# Patient Record
Sex: FEMALE | Race: WHITE | Employment: FULL TIME | URBAN - METROPOLITAN AREA
[De-identification: names, ages, dates, MRNs, and addresses within clinical notes are randomized per-mention and may not be internally consistent; named-entity substitution may affect disease eponyms.]

---

## 2019-06-14 ENCOUNTER — OFFICE VISIT (OUTPATIENT)
Dept: PODIATRY | Age: 34
End: 2019-06-14
Payer: COMMERCIAL

## 2019-06-14 VITALS — WEIGHT: 204 LBS | BODY MASS INDEX: 32.02 KG/M2 | HEIGHT: 67 IN

## 2019-06-14 DIAGNOSIS — M79.674 PAIN OF TOE OF RIGHT FOOT: ICD-10-CM

## 2019-06-14 DIAGNOSIS — L60.0 ONYCHOCRYPTOSIS: Primary | ICD-10-CM

## 2019-06-14 PROCEDURE — 99213 OFFICE O/P EST LOW 20 MIN: CPT | Performed by: PODIATRIST

## 2019-06-14 PROCEDURE — 11750 EXCISION NAIL&NAIL MATRIX: CPT | Performed by: PODIATRIST

## 2019-06-14 RX ORDER — ESTRADIOL 0.1 MG/D
FILM, EXTENDED RELEASE TRANSDERMAL
COMMUNITY
Start: 2019-06-09

## 2019-06-14 NOTE — PROGRESS NOTES
19     Trace Vásquez    : 1985   Sex: female    Age: 37 y.o. Patient's PCP/Provider is:  Cora Wayne MD    Subjective:  Patient is seen today with a chief complaint of painful ingrown nail to the right great toe medial border. Patient stated this issues been going on for the last several weeks. Patient presented today to discuss treatment options available. Patient denies any nausea, vomiting, fever, chills. No other issues noted at this time. Chief Complaint   Patient presents with    Toe Pain     great right toe       ROS:  Const: Positives and pertinent negatives as per HPI. Musculo: Denies symptoms other than stated above. Neuro: Denies symptoms other than stated above. Skin: Denies symptoms other than stated above. Current Medications:    Current Outpatient Medications:     Phentermine-Topiramate (QSYMIA) 3.75-23 MG CP24, Take 1 capsule by mouth., Disp: , Rfl:     metFORMIN (GLUCOPHAGE) 1000 MG tablet, Take 1,000 mg by mouth 2 times daily (with meals), Disp: , Rfl:     LEVOTHYROXINE SODIUM PO, Take by mouth, Disp: , Rfl:     OXcarbazepine (TRILEPTAL PO), Take by mouth, Disp: , Rfl:     estradiol (VIVELLE) 0.1 MG/24HR, , Disp: , Rfl:     naproxen (NAPROSYN) 500 MG tablet, Take 1 tablet by mouth 2 times daily, Disp: 60 tablet, Rfl: 0    Allergies: Allergies   Allergen Reactions    Zonisamide Itching and Rash    Lamictal [Lamotrigine]        Vitals:    19 1104   Weight: 204 lb (92.5 kg)   Height: 5' 7\" (1.702 m)       Exam:  VASCULAR: Pedal pulses palpable right foot. Capillary fill time brisk digits 1 through 5 right foot. NEUROLOGICAL: Epicritic sensations intact digital regions right foot. DERMATOLOGICAL: Medial border right great toe is incurvated at this time with tenderness noted to palpation. No purulence, odor, erythema or any signs of infection noted right great toe.   MUSCULOSKELETAL: Noncontributory    Diagnostic Studies:     No results was performed right great toe as described above. Patient tolerated the procedure without issues. Patient was given home-going instructions to be performed as instructed. It was discussed in detail with the patient proper hygiene for the diabetic foot. They are to get in the habit of looking at their feet or have someone look at them. If they are unable to do daily, they are to look for any signs of redness, blistering, cracking, swelling, drainage, open lesions, etc.  They are to dry in between the toes after each bath or shower gently. The water should be tested with the elbow to prevent burns. The patient is to refrain from soaking their feet unless instructed by myself to do otherwise. They are to refrain from going barefoot. Shoe gear should be inspected for any foreign objects. Shoes should have a deep wide toe box. With any type of shoe, the feet should be inspected for any signs of pressure, i.e. redness, blistering, or open sores. Further instructional guidelines were dispensed to the patient. Patient will be followed up in 1 week's time or sooner if needed for reevaluation. She was advised to call the office with any questions or concerns in the interim. Seen By:    Mark Tellez DPM    Electronically signed by Mark Tellez DPM on 6/14/2019 at 3:29 PM    This note was created using voice recognition software. The note was reviewed however may contain grammatical errors.

## 2019-06-14 NOTE — PROGRESS NOTES
Patient in today for evaluation of possible ingrown nail right great toe. Patient states she had a pocket of pus that developed and popped and has been bleeding on and off. Patient has been using neosporin at the site but does not seem to be helping. Patient was in [facility] on a trip when the problem developed after doing a lot of walking.  pcp is Gurmeet Earl MD last ov 3-49-67

## 2019-06-24 ENCOUNTER — OFFICE VISIT (OUTPATIENT)
Dept: PODIATRY | Age: 34
End: 2019-06-24

## 2019-06-24 DIAGNOSIS — L60.0 ONYCHOCRYPTOSIS: Primary | ICD-10-CM

## 2019-06-24 PROCEDURE — 99024 POSTOP FOLLOW-UP VISIT: CPT | Performed by: PODIATRIST

## 2019-06-24 NOTE — PROGRESS NOTES
Patient in today for 1 week matrix f/u great right toe. Patient states she has had blood drainage and the site is still very tender.  pcp is Allyson Tripp MD last ov 7-80-91

## 2019-06-24 NOTE — PROGRESS NOTES
19     Savage Wilder    : 1985   Sex: female    Age: 37 y.o. Patient's PCP/Provider is:  Ella Valle MD    Subjective:  Patient is seen today follow-up regarding continued treatment post matrix procedure right great toe. Overall patient is doing well at this time without any issues noted. Patient denies any nausea, vomiting, fever, chills. Chief Complaint   Patient presents with    Follow-up     great right toe matrix       ROS:  Const: Positives and pertinent negatives as per HPI. Musculo: Denies symptoms other than stated above. Neuro: Denies symptoms other than stated above. Skin: Denies symptoms other than stated above. Current Medications:    Current Outpatient Medications:     estradiol (VIVELLE) 0.1 MG/24HR, , Disp: , Rfl:     Phentermine-Topiramate (QSYMIA) 3.75-23 MG CP24, Take 1 capsule by mouth., Disp: , Rfl:     metFORMIN (GLUCOPHAGE) 1000 MG tablet, Take 1,000 mg by mouth 2 times daily (with meals), Disp: , Rfl:     LEVOTHYROXINE SODIUM PO, Take by mouth, Disp: , Rfl:     OXcarbazepine (TRILEPTAL PO), Take by mouth, Disp: , Rfl:     naproxen (NAPROSYN) 500 MG tablet, Take 1 tablet by mouth 2 times daily, Disp: 60 tablet, Rfl: 0    Allergies: Allergies   Allergen Reactions    Zonisamide Itching and Rash    Lamictal [Lamotrigine]        There were no vitals filed for this visit. Exam:  Neurovascular status grossly intact right foot. Medial border right great toe is well-healed. No tenderness noted to palpation matrix site right great toe. No signs of infection noted right great toe. Diagnostic Studies:     No results found. Procedures:    None    Plan Per Assessment  [facility] was seen today for follow-up. Diagnoses and all orders for this visit:    Onychocryptosis      1. Post procedure evaluation and management  2. We did discuss appropriate nail trimming techniques to prevent reoccurrence.   Shoe recommendations were discussed with adequate room in the toe box regions to prevent reoccurrence. 3. Patient will be followed up at a later date if any further Podiatric issues arise. Seen By:    Maged Szymanski DPM    Electronically signed by Maged Szymanski DPM on 6/24/2019 at 10:01 AM    This note was created using voice recognition software. The note was reviewed however may contain grammatical errors.

## 2020-03-30 ENCOUNTER — OFFICE VISIT (OUTPATIENT)
Dept: PODIATRY | Age: 35
End: 2020-03-30
Payer: COMMERCIAL

## 2020-03-30 VITALS
WEIGHT: 193 LBS | TEMPERATURE: 97.3 F | HEIGHT: 68 IN | HEART RATE: 108 BPM | OXYGEN SATURATION: 96 % | BODY MASS INDEX: 29.25 KG/M2

## 2020-03-30 PROBLEM — E11.9 TYPE 2 DIABETES MELLITUS WITHOUT COMPLICATION, WITHOUT LONG-TERM CURRENT USE OF INSULIN (HCC): Status: ACTIVE | Noted: 2020-03-30

## 2020-03-30 PROBLEM — L60.0 OC (ONYCHOCRYPTOSIS): Status: ACTIVE | Noted: 2020-03-30

## 2020-03-30 PROCEDURE — G8417 CALC BMI ABV UP PARAM F/U: HCPCS | Performed by: PODIATRIST

## 2020-03-30 PROCEDURE — 1036F TOBACCO NON-USER: CPT | Performed by: PODIATRIST

## 2020-03-30 PROCEDURE — 99213 OFFICE O/P EST LOW 20 MIN: CPT | Performed by: PODIATRIST

## 2020-03-30 PROCEDURE — G8484 FLU IMMUNIZE NO ADMIN: HCPCS | Performed by: PODIATRIST

## 2020-03-30 PROCEDURE — 2022F DILAT RTA XM EVC RTNOPTHY: CPT | Performed by: PODIATRIST

## 2020-03-30 PROCEDURE — 3046F HEMOGLOBIN A1C LEVEL >9.0%: CPT | Performed by: PODIATRIST

## 2020-03-30 PROCEDURE — G8427 DOCREV CUR MEDS BY ELIG CLIN: HCPCS | Performed by: PODIATRIST

## 2020-03-30 NOTE — PROGRESS NOTES
3/30/20     Zack Summers    : 1985   Sex: female    Age: 37 y.o. Patient's PCP/Provider is:  Paulino Cerda MD    Subjective:  Patient is seen today for evaluation regarding treatment regarding ingrown nail left great toe. Patient stated the issues been going on over the last 7 days or so and progressively getting worse. She was concerned due to possible infection which she has had in the past.  She denies any additional issues at this time. No other abnormalities noted. Chief Complaint   Patient presents with    Ingrown Toenail     great left toe       ROS:  Const: Positives and pertinent negatives as per HPI. Musculo: Denies symptoms other than stated above. Neuro: Denies symptoms other than stated above. Skin: Denies symptoms other than stated above. Current Medications:    Current Outpatient Medications:     estradiol (VIVELLE) 0.1 MG/24HR, , Disp: , Rfl:     Phentermine-Topiramate (QSYMIA) 3.75-23 MG CP24, Take 1 capsule by mouth., Disp: , Rfl:     metFORMIN (GLUCOPHAGE) 1000 MG tablet, Take 1,000 mg by mouth 2 times daily (with meals), Disp: , Rfl:     LEVOTHYROXINE SODIUM PO, Take by mouth, Disp: , Rfl:     OXcarbazepine (TRILEPTAL PO), Take by mouth, Disp: , Rfl:     naproxen (NAPROSYN) 500 MG tablet, Take 1 tablet by mouth 2 times daily, Disp: 60 tablet, Rfl: 0    Allergies: Allergies   Allergen Reactions    Zonisamide Itching and Rash    Lamictal [Lamotrigine]        Vitals:    20 1301   Pulse: 108   Temp: 97.3 °F (36.3 °C)   SpO2: 96%   Weight: 193 lb (87.5 kg)   Height: 5' 8\" (1.727 m)       Exam:  VASCULAR: Pedal pulses palpable left foot. Capillary fill time less than 3 seconds digits 1 through 5 left foot. NEUROLOGICAL: Epicritic sensations intact digital regions left foot. DERMATOLOGICAL: Medial border left great toe is incurvated with tenderness noted to palpation. No purulence, odor, erythema or any signs of infection noted left great toe.   No maceration of the webspaces noted left foot. MUSCULOSKELETAL: Adequate range of motion left great toe IPJ and MTPJ region. Diagnostic Studies:     No results found. Procedures:    None    Plan Per Assessment  Madalyn was seen today for ingrown toenail. Diagnoses and all orders for this visit:    OC (onychocryptosis)    Pain of toe of left foot    Type 2 diabetes mellitus without complication, without long-term current use of insulin ([address]      1. Evaluation and management  2. Debridement ingrown nail was performed to patient tolerance. We did advise patient on appropriate nail trimming techniques to prevent reoccurrence. No nail matrix procedure was performed on today's visit. 3. It was discussed in detail with the patient proper hygiene for the diabetic foot. They are to get in the habit of looking at their feet or have someone look at them. If they are unable to do daily, they are to look for any signs of redness, blistering, cracking, swelling, drainage, open lesions, etc.  They are to dry in between the toes after each bath or shower gently. The water should be tested with the elbow to prevent burns. The patient is to refrain from soaking their feet unless instructed by myself to do otherwise. They are to refrain from going barefoot. Shoe gear should be inspected for any foreign objects. Shoes should have a deep wide toe box. With any type of shoe, the feet should be inspected for any signs of pressure, i.e. redness, blistering, or open sores. Further instructional guidelines were dispensed to the patient. 4. Patient will be followed up at a later date if any further diabetic or Podiatric issues arise. Seen By:    Rodney Wade DPM    Electronically signed by Rodney Wade DPM on 3/30/2020 at 1:29 PM    This note was created using voice recognition software. The note was reviewed however may contain grammatical errors.

## 2020-08-19 ENCOUNTER — OFFICE VISIT (OUTPATIENT)
Dept: PODIATRY | Age: 35
End: 2020-08-19
Payer: COMMERCIAL

## 2020-08-19 VITALS — BODY MASS INDEX: 29.25 KG/M2 | WEIGHT: 193 LBS | HEIGHT: 68 IN | TEMPERATURE: 97.8 F

## 2020-08-19 PROBLEM — M79.675 PAIN OF TOE OF LEFT FOOT: Status: ACTIVE | Noted: 2020-08-19

## 2020-08-19 PROCEDURE — 99213 OFFICE O/P EST LOW 20 MIN: CPT | Performed by: PODIATRIST

## 2020-08-19 PROCEDURE — G8427 DOCREV CUR MEDS BY ELIG CLIN: HCPCS | Performed by: PODIATRIST

## 2020-08-19 PROCEDURE — 1036F TOBACCO NON-USER: CPT | Performed by: PODIATRIST

## 2020-08-19 PROCEDURE — G8417 CALC BMI ABV UP PARAM F/U: HCPCS | Performed by: PODIATRIST

## 2020-08-19 NOTE — PROGRESS NOTES
Patient is here today for evaluation of painful great left toe nail. She states it has recently been bleeding.

## 2020-08-19 NOTE — PROGRESS NOTES
20     Darrell Payne    : 1985   Sex: female    Age: 38 y.o. Patient's PCP/Provider is:  Vianey Porter MD    Subjective:  Patient is seen today for evaluation regarding ingrown nail issue left great toe. Patient stated the issues been going on over the last 5 to 7 days and started bleeding over the weekend. She presented today for evaluation and care. She denies any nausea, vomiting, fever, chills. Chief Complaint   Patient presents with    Nail Problem       ROS:  Const: Positives and pertinent negatives as per HPI. Musculo: Denies symptoms other than stated above. Neuro: Denies symptoms other than stated above. Skin: Denies symptoms other than stated above. Current Medications:    Current Outpatient Medications:     estradiol (VIVELLE) 0.1 MG/24HR, , Disp: , Rfl:     Phentermine-Topiramate (QSYMIA) 3.75-23 MG CP24, Take 1 capsule by mouth., Disp: , Rfl:     metFORMIN (GLUCOPHAGE) 1000 MG tablet, Take 1,000 mg by mouth 2 times daily (with meals), Disp: , Rfl:     LEVOTHYROXINE SODIUM PO, Take by mouth, Disp: , Rfl:     OXcarbazepine (TRILEPTAL PO), Take by mouth, Disp: , Rfl:     naproxen (NAPROSYN) 500 MG tablet, Take 1 tablet by mouth 2 times daily, Disp: 60 tablet, Rfl: 0    Allergies: Allergies   Allergen Reactions    Zonisamide Itching and Rash    Lamictal [Lamotrigine]        Vitals:    20 0707   Temp: 97.8 °F (36.6 °C)   Weight: 193 lb (87.5 kg)   Height: 5' 8\" (1.727 m)       Exam:  Neurovascular status unchanged. Tenderness noted medial nail border left great toe. Incurvated nail border noted without signs of infection noted. No hyper granular tissue or any bleeding noted left great toe nail border regions. No maceration webspaces noted left foot. Diagnostic Studies:     No results found. Procedures:    None    Plan Per Assessment  Yuan Howell was seen today for nail problem.     Diagnoses and all orders for this visit:    Onychocryptosis    Pain of toe of left foot      1. Evaluation and management  2. Nail debridement performed ingrown nail border left great toe to patient tolerance. No nail matrix procedure performed on today's visit. 3. We did discuss appropriate nail trimming techniques to prevent reoccurrence. We did discuss appropriate diabetic foot care techniques as well today. 4. Patient will be followed up at a later date for continued evaluation and diabetic foot care. Seen By:    Priscila Rosas DPM    Electronically signed by Priscila Rosas DPM on 8/19/2020 at 7:28 AM    This note was created using voice recognition software. The note was reviewed however may contain grammatical errors.

## 2020-10-07 ENCOUNTER — OFFICE VISIT (OUTPATIENT)
Dept: PODIATRY | Age: 35
End: 2020-10-07
Payer: COMMERCIAL

## 2020-10-07 VITALS — BODY MASS INDEX: 28.64 KG/M2 | HEIGHT: 68 IN | WEIGHT: 189 LBS | TEMPERATURE: 97.1 F

## 2020-10-07 PROBLEM — M72.2 PLANTAR FASCIITIS: Status: ACTIVE | Noted: 2020-10-07

## 2020-10-07 PROCEDURE — 1036F TOBACCO NON-USER: CPT | Performed by: PODIATRIST

## 2020-10-07 PROCEDURE — G8484 FLU IMMUNIZE NO ADMIN: HCPCS | Performed by: PODIATRIST

## 2020-10-07 PROCEDURE — 99213 OFFICE O/P EST LOW 20 MIN: CPT | Performed by: PODIATRIST

## 2020-10-07 PROCEDURE — G8428 CUR MEDS NOT DOCUMENT: HCPCS | Performed by: PODIATRIST

## 2020-10-07 PROCEDURE — G8417 CALC BMI ABV UP PARAM F/U: HCPCS | Performed by: PODIATRIST

## 2020-10-07 NOTE — PROGRESS NOTES
10/7/20     Tonya China    : 1985   Sex: female    Age: 38 y.o. Patient's PCP/Provider is:  Sofi Aj MD    Subjective:  Patient is seen today for evaluation regarding plantar fascial issues left foot. Patient stated that she started a walking regimen to lose some weight several weeks ago and started noticing some paresthesias into the plantar left arch and great toe region. She denies any recent injury. She stated that she was getting some discomfort into the left great toe region as well. No other additional abnormalities noted at this time. Chief Complaint   Patient presents with    Ingrown Toenail    Toe Pain       ROS:  Const: Positives and pertinent negatives as per HPI. Musculo: Denies symptoms other than stated above. Neuro: Denies symptoms other than stated above. Skin: Denies symptoms other than stated above. Current Medications:    Current Outpatient Medications:     estradiol (VIVELLE) 0.1 MG/24HR, , Disp: , Rfl:     Phentermine-Topiramate (QSYMIA) 3.75-23 MG CP24, Take 1 capsule by mouth., Disp: , Rfl:     metFORMIN (GLUCOPHAGE) 1000 MG tablet, Take 1,000 mg by mouth 2 times daily (with meals), Disp: , Rfl:     LEVOTHYROXINE SODIUM PO, Take by mouth, Disp: , Rfl:     OXcarbazepine (TRILEPTAL PO), Take by mouth, Disp: , Rfl:     naproxen (NAPROSYN) 500 MG tablet, Take 1 tablet by mouth 2 times daily, Disp: 60 tablet, Rfl: 0    Allergies: Allergies   Allergen Reactions    Zonisamide Itching and Rash    Lamictal [Lamotrigine]        Vitals:    10/07/20 0702   Temp: 97.1 °F (36.2 °C)   Weight: 189 lb (85.7 kg)   Height: 5' 8\" (1.727 m)       Exam:  Neurovascular status unchanged. Mild tenderness into the plantar fascial region left foot with range of motion and muscle testing performed. Mild paresthesias noted along the course of the tibial nerve left foot. No ingrown nail issues noted left great toe.       Diagnostic Studies:     No results found.      Procedures:    None    Plan Per Assessment  [facility] was seen today for ingrown toenail and toe pain. Diagnoses and all orders for this visit:    Plantar fasciitis    Pain in left foot    Difficulty walking      1. Evaluation and management  2. We did discuss continued stretching and strengthening exercises on a daily basis to relieve her plantar fascial issues. 3. Most importantly, we did discuss purchasing athletic shoe gear to give her added support with her walking activities to prevent the paresthesia issues and potential for ingrown nail issues bilateral foot. 4. Patient will be followed up for her regularly scheduled foot care appointment or sooner if needed. Seen By:    Violetta Barnes DPM    Electronically signed by Violetta Barnes DPM on 10/7/2020 at 7:24 AM    This note was created using voice recognition software. The note was reviewed however may contain grammatical errors.

## 2020-11-18 ENCOUNTER — OFFICE VISIT (OUTPATIENT)
Dept: PODIATRY | Age: 35
End: 2020-11-18
Payer: COMMERCIAL

## 2020-11-18 VITALS — TEMPERATURE: 97.3 F | WEIGHT: 185 LBS | HEIGHT: 68 IN | BODY MASS INDEX: 28.04 KG/M2

## 2020-11-18 PROCEDURE — G8484 FLU IMMUNIZE NO ADMIN: HCPCS | Performed by: PODIATRIST

## 2020-11-18 PROCEDURE — 1036F TOBACCO NON-USER: CPT | Performed by: PODIATRIST

## 2020-11-18 PROCEDURE — G8417 CALC BMI ABV UP PARAM F/U: HCPCS | Performed by: PODIATRIST

## 2020-11-18 PROCEDURE — 2022F DILAT RTA XM EVC RTNOPTHY: CPT | Performed by: PODIATRIST

## 2020-11-18 PROCEDURE — G8427 DOCREV CUR MEDS BY ELIG CLIN: HCPCS | Performed by: PODIATRIST

## 2020-11-18 PROCEDURE — 3046F HEMOGLOBIN A1C LEVEL >9.0%: CPT | Performed by: PODIATRIST

## 2020-11-18 PROCEDURE — 99213 OFFICE O/P EST LOW 20 MIN: CPT | Performed by: PODIATRIST

## 2020-11-18 NOTE — PROGRESS NOTES
Patient in today for nail care. Patient c/o pain to the left heel. She did purchase castro tennis shoes after her last visit with us and has been wearing them as much as she can for approximately 1 moth.  Patient's PCP is Trevon Boswell MD date of last ov   10-      Jarad Connelly LPN

## 2020-11-18 NOTE — PROGRESS NOTES
20     Doyal Sera    : 1985   Sex: female    Age: 38 y.o. Patient's PCP/Provider is:  Trevon Boswell MD    Subjective:  Patient is seen today for follow-up regarding continued care ingrown nail issues left great toe. Overall patient is doing better at this time without any additional issues. She denies any nausea, vomiting, fever, chills. No other additional abnormalities noted. Chief Complaint   Patient presents with    Nail Problem    Foot Pain       ROS:  Const: Positives and pertinent negatives as per HPI. Musculo: Denies symptoms other than stated above. Neuro: Denies symptoms other than stated above. Skin: Denies symptoms other than stated above. Current Medications:    Current Outpatient Medications:     Semaglutide (OZEMPIC, 0.25 OR 0.5 MG/DOSE, SC), Inject into the skin, Disp: , Rfl:     estradiol (VIVELLE) 0.1 MG/24HR, , Disp: , Rfl:     Phentermine-Topiramate (QSYMIA) 3.75-23 MG CP24, Take 1 capsule by mouth., Disp: , Rfl:     metFORMIN (GLUCOPHAGE) 1000 MG tablet, Take 1,000 mg by mouth 2 times daily (with meals), Disp: , Rfl:     LEVOTHYROXINE SODIUM PO, Take by mouth, Disp: , Rfl:     OXcarbazepine (TRILEPTAL PO), Take by mouth, Disp: , Rfl:     naproxen (NAPROSYN) 500 MG tablet, Take 1 tablet by mouth 2 times daily, Disp: 60 tablet, Rfl: 0    Allergies: Allergies   Allergen Reactions    Zonisamide Itching and Rash    Lamictal [Lamotrigine]        Vitals:    20 0656   Temp: 97.3 °F (36.3 °C)   Weight: 185 lb (83.9 kg)   Height: 5' 8\" (1.727 m)       Exam:  Neurovascular status unchanged. Left great toe does have incurvated nail borders noted. Minimal tenderness noted to palpation. No signs of infection noted left great toe. No maceration of webspaces noted bilateral foot. No plantar calluses, ulcerations, heel fissuring noted bilateral foot. Diagnostic Studies:     No results found.       Procedures:    None    Plan Per Rory Reeves was seen today for nail problem and foot pain. Diagnoses and all orders for this visit:    OC (onychocryptosis)    Pain of toe of left foot    Type 2 diabetes mellitus without complication, without long-term current use of insulin ([address]      1. Evaluation and management  2. Debridement ingrown nail borders was performed to patient tolerance. No nail matrix procedure performed on today's visit. 3. Patient was discussed appropriate diabetic foot care techniques and nail care techniques to prevent reoccurrence. 4. Patient will be followed up at a later date for continued evaluation and management. Seen By:    Arpita Lugo DPM    Electronically signed by Arpita Lugo DPM on 11/18/2020 at 7:37 AM    This note was created using voice recognition software. The note was reviewed however may contain grammatical errors.

## 2021-01-20 ENCOUNTER — OFFICE VISIT (OUTPATIENT)
Dept: PODIATRY | Age: 36
End: 2021-01-20
Payer: COMMERCIAL

## 2021-01-20 VITALS — WEIGHT: 185 LBS | BODY MASS INDEX: 28.04 KG/M2 | HEIGHT: 68 IN

## 2021-01-20 DIAGNOSIS — M25.572 PAIN IN LEFT ANKLE AND JOINTS OF LEFT FOOT: ICD-10-CM

## 2021-01-20 DIAGNOSIS — R60.0 LOCALIZED EDEMA: ICD-10-CM

## 2021-01-20 DIAGNOSIS — M77.52 TENDONITIS OF ANKLE, LEFT: ICD-10-CM

## 2021-01-20 DIAGNOSIS — M79.675 PAIN OF TOE OF LEFT FOOT: ICD-10-CM

## 2021-01-20 DIAGNOSIS — L60.0 OC (ONYCHOCRYPTOSIS): Primary | ICD-10-CM

## 2021-01-20 DIAGNOSIS — R26.2 DIFFICULTY WALKING: ICD-10-CM

## 2021-01-20 DIAGNOSIS — E11.9 TYPE 2 DIABETES MELLITUS WITHOUT COMPLICATION, WITHOUT LONG-TERM CURRENT USE OF INSULIN (HCC): ICD-10-CM

## 2021-01-20 PROCEDURE — 29580 STRAPPING UNNA BOOT: CPT | Performed by: PODIATRIST

## 2021-01-20 PROCEDURE — 2022F DILAT RTA XM EVC RTNOPTHY: CPT | Performed by: PODIATRIST

## 2021-01-20 PROCEDURE — 1036F TOBACCO NON-USER: CPT | Performed by: PODIATRIST

## 2021-01-20 PROCEDURE — 3046F HEMOGLOBIN A1C LEVEL >9.0%: CPT | Performed by: PODIATRIST

## 2021-01-20 PROCEDURE — G8417 CALC BMI ABV UP PARAM F/U: HCPCS | Performed by: PODIATRIST

## 2021-01-20 PROCEDURE — G8427 DOCREV CUR MEDS BY ELIG CLIN: HCPCS | Performed by: PODIATRIST

## 2021-01-20 PROCEDURE — G8484 FLU IMMUNIZE NO ADMIN: HCPCS | Performed by: PODIATRIST

## 2021-01-20 PROCEDURE — 99213 OFFICE O/P EST LOW 20 MIN: CPT | Performed by: PODIATRIST

## 2021-01-20 NOTE — PROGRESS NOTES
21     Beth Kevin    : 1985   Sex: female    Age: 38 y.o. Patient's PCP/Provider is:  Milton Yeboah MD    Subjective:  Patient is seen today for evaluation regarding painful ingrown nail left great toe. She is also complaining of new onset anterior left ankle pain and swelling. She did start a new exercise regimen over the last several weeks which has contributed to her current symptoms left ankle. She denies any recent injury to the left lower extremity. Patient denies any nausea, vomiting, fever, chills regarding the ingrown nail issue left great toe. No other additional abnormalities noted at this time. Chief Complaint   Patient presents with    Nail Problem     nail care    Diabetes    Foot Pain     left/plantar fasciitis       ROS:  Const: Positives and pertinent negatives as per HPI. Musculo: Denies symptoms other than stated above. Neuro: Denies symptoms other than stated above. Skin: Denies symptoms other than stated above. Current Medications:    Current Outpatient Medications:     Semaglutide (OZEMPIC, 0.25 OR 0.5 MG/DOSE, SC), Inject into the skin, Disp: , Rfl:     estradiol (VIVELLE) 0.1 MG/24HR, , Disp: , Rfl:     Phentermine-Topiramate (QSYMIA) 3.75-23 MG CP24, Take 1 capsule by mouth., Disp: , Rfl:     metFORMIN (GLUCOPHAGE) 1000 MG tablet, Take 1,000 mg by mouth 2 times daily (with meals), Disp: , Rfl:     LEVOTHYROXINE SODIUM PO, Take by mouth, Disp: , Rfl:     OXcarbazepine (TRILEPTAL PO), Take by mouth, Disp: , Rfl:     naproxen (NAPROSYN) 500 MG tablet, Take 1 tablet by mouth 2 times daily, Disp: 60 tablet, Rfl: 0    Allergies:   Allergies   Allergen Reactions    Zonisamide Itching and Rash    Lamictal [Lamotrigine]        Vitals:    21 0658   Weight: 185 lb (83.9 kg)   Height: 5' 8\" (1.727 m)       Exam: Neurovascular status unchanged. Tenderness noted to the left great toe and lateral nail border to palpation. No purulence, odor, erythema or any signs of infection noted left great toe. No maceration webspaces noted left foot. Edema noted anterior aspect left ankle with tenderness along the course of the extensor tendon complex with range of motion and muscle testing performed. No ecchymotic skin changes present left lower extremity. Diagnostic Studies:     No results found. Procedures: An unna boot  compressive wrap was applied to the left lower extremity. It's purpose is to  decrease  the amount of edema present, and to allow proper healing of the soft tissues. The patient was instructed to keep the unna boot clean, dry and intact until the next follow up visit. The patient was instructed to look for signs of redness, irritation, blistering and pain. If these or any other symptoms were to develop, they were advised to contact the office immediately for reevaluation. Plan Per Assessment  [employer] was seen today for nail problem, diabetes and foot pain. Diagnoses and all orders for this visit:    OC (onychocryptosis)    Pain of toe of left foot    Tendonitis of ankle, left    Localized edema    Pain in left ankle and joints of left foot    Difficulty walking    Type 2 diabetes mellitus without complication, without long-term current use of insulin ([address]      1. Evaluation and management  2. Debridement ingrown nail was performed to patient tolerance. No nail matrix procedure performed on today's visit. 3. Compression dressing was applied to the symptomatic left lower extremity as described above. Patient was advised on limiting her daily activities and elevating the left lower extremity throughout the day for continued symptom improvement. 4. We did discuss additional diabetic foot care techniques with patient in detail today. 5. Patient will be followed up in 1 week's time or sooner if needed for reevaluation. She was advised to call the office with any questions or concerns in the interim. Seen By:    Mishel Rosales DPM    Electronically signed by Mishel Rosales DPM on 1/20/2021 at 11:04 AM    This note was created using voice recognition software. The note was reviewed however may contain grammatical errors.

## 2021-01-20 NOTE — PROGRESS NOTES
Patient here for routine nail care, but has complaints of pain in the left foot with the plantar fasciitis. Patient states that she has been walking more to loose weight and the pain in the foot increased.  Last ov with pcp was 1/19/2021 with [place] MD             Electronically signed by Dulce Mcintosh MA on 1/20/2021 at 6:57 AM

## 2021-01-27 ENCOUNTER — OFFICE VISIT (OUTPATIENT)
Dept: PODIATRY | Age: 36
End: 2021-01-27
Payer: COMMERCIAL

## 2021-01-27 VITALS — HEIGHT: 68 IN | BODY MASS INDEX: 28.04 KG/M2 | WEIGHT: 185 LBS

## 2021-01-27 DIAGNOSIS — M77.52 TENDONITIS OF ANKLE, LEFT: Primary | ICD-10-CM

## 2021-01-27 DIAGNOSIS — E11.9 TYPE 2 DIABETES MELLITUS WITHOUT COMPLICATION, WITHOUT LONG-TERM CURRENT USE OF INSULIN (HCC): ICD-10-CM

## 2021-01-27 DIAGNOSIS — M72.2 PLANTAR FASCIITIS: ICD-10-CM

## 2021-01-27 PROBLEM — M79.675 PAIN OF TOE OF LEFT FOOT: Status: RESOLVED | Noted: 2020-08-19 | Resolved: 2021-01-27

## 2021-01-27 PROCEDURE — 3046F HEMOGLOBIN A1C LEVEL >9.0%: CPT | Performed by: PODIATRIST

## 2021-01-27 PROCEDURE — 99213 OFFICE O/P EST LOW 20 MIN: CPT | Performed by: PODIATRIST

## 2021-01-27 PROCEDURE — G8427 DOCREV CUR MEDS BY ELIG CLIN: HCPCS | Performed by: PODIATRIST

## 2021-01-27 PROCEDURE — 1036F TOBACCO NON-USER: CPT | Performed by: PODIATRIST

## 2021-01-27 PROCEDURE — G8417 CALC BMI ABV UP PARAM F/U: HCPCS | Performed by: PODIATRIST

## 2021-01-27 PROCEDURE — 2022F DILAT RTA XM EVC RTNOPTHY: CPT | Performed by: PODIATRIST

## 2021-01-27 PROCEDURE — G8484 FLU IMMUNIZE NO ADMIN: HCPCS | Performed by: PODIATRIST

## 2021-01-27 NOTE — PROGRESS NOTES
21     Lauren Lopez    : 1985   Sex: female    Age: 38 y.o. Patient's PCP/Provider is:  Diann Torres MD    Subjective:  Patient is seen today for follow-up regarding continued evaluation regarding tendinitis and plantar fascial issues left lower extremity. Patient stated she is about 75% improved from previous compression dressing applied. She denies any additional issues at this time. She did want to discuss other potential treatment options available to prevent reoccurrence. Chief Complaint   Patient presents with    Follow-up     unna wrap left       ROS:  Const: Positives and pertinent negatives as per HPI. Musculo: Denies symptoms other than stated above. Neuro: Denies symptoms other than stated above. Skin: Denies symptoms other than stated above. Current Medications:    Current Outpatient Medications:     Semaglutide (OZEMPIC, 0.25 OR 0.5 MG/DOSE, SC), Inject into the skin, Disp: , Rfl:     estradiol (VIVELLE) 0.1 MG/24HR, , Disp: , Rfl:     Phentermine-Topiramate (QSYMIA) 3.75-23 MG CP24, Take 1 capsule by mouth., Disp: , Rfl:     metFORMIN (GLUCOPHAGE) 1000 MG tablet, Take 1,000 mg by mouth 2 times daily (with meals), Disp: , Rfl:     LEVOTHYROXINE SODIUM PO, Take by mouth, Disp: , Rfl:     OXcarbazepine (TRILEPTAL PO), Take by mouth, Disp: , Rfl:     naproxen (NAPROSYN) 500 MG tablet, Take 1 tablet by mouth 2 times daily, Disp: 60 tablet, Rfl: 0    Allergies:   Allergies   Allergen Reactions    Zonisamide Itching and Rash    Lamictal [Lamotrigine]        Vitals:    21 0657   Weight: 185 lb (83.9 kg)   Height: 5' 8\" (1.727 m)       Exam: Neurovascular status unchanged. Minimal tenderness noted into the anterior left midfoot region and plantar fascial region with range of motion and muscle testing performed. Edematous issues are improved left lower extremity. No plantar calluses or heel fissuring noted left foot. No paresthesias noted upon percussion tarsal tunnel region left foot. Diagnostic Studies:     No results found. Procedures:    None    Plan Per Assessment  Marichuy Tierney was seen today for follow-up. Diagnoses and all orders for this visit:    Tendonitis of ankle, left    Plantar fasciitis    Type 2 diabetes mellitus without complication, without long-term current use of insulin ([address]      1. Evaluation and management  2. We did discuss additional treatment options with patient in detail today. We did give her information on purchasing OTC compression garments to be worn on the symptomatic lower extremities to prevent reoccurrence. 3. Patient was advised on daily stretching and strengthening exercises and use of her good supportive shoe gear and OTC insoles. Patient was advised on the use of OTC anti-inflammatory medications as needed for symptom issues. 4. Patient will be followed up at a later date for continued evaluation and care. Seen By:    Juni Lockwood DPM    Electronically signed by Juni Lockwood DPM on 1/27/2021 at 7:24 AM    This note was created using voice recognition software. The note was reviewed however may contain grammatical errors.

## 2021-01-27 NOTE — PROGRESS NOTES
Patient here for a week follow up unna wrap left foot. Patient kept the wrap on for four days and did notice some improvement. Patient states she still has some discomfort d/t the plantar fasciitis.           Electronically signed by More Cisneros MA on 1/27/2021 at 6:57 AM

## 2021-03-10 ENCOUNTER — OFFICE VISIT (OUTPATIENT)
Dept: PODIATRY | Age: 36
End: 2021-03-10
Payer: COMMERCIAL

## 2021-03-10 VITALS — BODY MASS INDEX: 28.04 KG/M2 | HEIGHT: 68 IN | WEIGHT: 185 LBS

## 2021-03-10 DIAGNOSIS — M79.672 PAIN IN LEFT FOOT: ICD-10-CM

## 2021-03-10 DIAGNOSIS — M72.2 PLANTAR FASCIITIS: Primary | ICD-10-CM

## 2021-03-10 DIAGNOSIS — R26.2 DIFFICULTY WALKING: ICD-10-CM

## 2021-03-10 PROCEDURE — G8484 FLU IMMUNIZE NO ADMIN: HCPCS | Performed by: PODIATRIST

## 2021-03-10 PROCEDURE — G8427 DOCREV CUR MEDS BY ELIG CLIN: HCPCS | Performed by: PODIATRIST

## 2021-03-10 PROCEDURE — 99213 OFFICE O/P EST LOW 20 MIN: CPT | Performed by: PODIATRIST

## 2021-03-10 PROCEDURE — 1036F TOBACCO NON-USER: CPT | Performed by: PODIATRIST

## 2021-03-10 PROCEDURE — G8417 CALC BMI ABV UP PARAM F/U: HCPCS | Performed by: PODIATRIST

## 2021-03-10 NOTE — PROGRESS NOTES
Patient is here follow up left foot. Patient states the sleeve for left foot has helped. Pain still in the left great toe.        Last ov with Chelsea Valentine MD  Was 03/2/21    Electronically signed by Julio Rojo LPN on 6/45/8671 at 1:98 AM

## 2021-03-10 NOTE — PROGRESS NOTES
3/10/21     Markos Holly    : 1985   Sex: female    Age: 38 y.o. Patient's PCP/Provider is:  Sheila Sebastian MD    Subjective:  Patient is seen today for follow-up regarding continued evaluation regarding plantar fascial issues left foot. Patient is wearing her good supportive shoe gear, OTC insoles, and compression sleeve as recommended with noted improvement in symptoms. She denies any additional issues at this time. Chief Complaint   Patient presents with    Follow-up     left       ROS:  Const: Positives and pertinent negatives as per HPI. Musculo: Denies symptoms other than stated above. Neuro: Denies symptoms other than stated above. Skin: Denies symptoms other than stated above. Current Medications:    Current Outpatient Medications:     Semaglutide (OZEMPIC, 0.25 OR 0.5 MG/DOSE, SC), Inject into the skin, Disp: , Rfl:     estradiol (VIVELLE) 0.1 MG/24HR, , Disp: , Rfl:     Phentermine-Topiramate (QSYMIA) 3.75-23 MG CP24, Take 1 capsule by mouth., Disp: , Rfl:     metFORMIN (GLUCOPHAGE) 1000 MG tablet, Take 1,000 mg by mouth 2 times daily (with meals), Disp: , Rfl:     LEVOTHYROXINE SODIUM PO, Take by mouth, Disp: , Rfl:     OXcarbazepine (TRILEPTAL PO), Take by mouth, Disp: , Rfl:     naproxen (NAPROSYN) 500 MG tablet, Take 1 tablet by mouth 2 times daily, Disp: 60 tablet, Rfl: 0    Allergies: Allergies   Allergen Reactions    Zonisamide Itching and Rash    Lamictal [Lamotrigine]        Vitals:    03/10/21 0704   Weight: 185 lb (83.9 kg)   Height: 5' 8\" (1.727 m)       Exam:  VASCULAR: Pedal pulses palpable left foot. NEUROLOGICAL: Epicritic sensations intact left foot. No paresthesias noted upon percussion tarsal tunnel region left foot. DERMATOLOGICAL: No edema or ecchymotic skin changes present left lower extremity. MUSCULOSKELETAL: Increased range of motion noted left ankle and subtalar joint.   Minimal tenderness into the plantar fascial region left lower extremity. Improved gait noted upon evaluation. Diagnostic Studies:     No results found. Procedures:    None    Plan Per Assessment  Chapincito King was seen today for follow-up. Diagnoses and all orders for this visit:    Plantar fasciitis    Pain in left foot    Difficulty walking      1. Evaluation and management  2. We did advise the patient continued use of her OTC insoles and supportive shoe gear with all ambulatory activities. Patient is to continue with her current plantar fascial sleeve with all ambulatory activities as well. 3. Patient was advised on daily stretching and strengthening exercises to prevent symptom reoccurrence. 4. Patient will be followed up at a later date for continued evaluation and care. Seen By:    Eric Persaud DPM    Electronically signed by Eric Persaud DPM on 3/10/2021 at 3:01 PM    This note was created using voice recognition software. The note was reviewed however may contain grammatical errors.

## 2021-04-07 ENCOUNTER — OFFICE VISIT (OUTPATIENT)
Dept: PODIATRY | Age: 36
End: 2021-04-07
Payer: COMMERCIAL

## 2021-04-07 VITALS — WEIGHT: 185 LBS | HEIGHT: 68 IN | BODY MASS INDEX: 28.04 KG/M2

## 2021-04-07 DIAGNOSIS — R20.2 PARESTHESIA OF LEFT FOOT: ICD-10-CM

## 2021-04-07 DIAGNOSIS — M79.672 LEFT FOOT PAIN: ICD-10-CM

## 2021-04-07 DIAGNOSIS — E11.9 TYPE 2 DIABETES MELLITUS WITHOUT COMPLICATION, WITHOUT LONG-TERM CURRENT USE OF INSULIN (HCC): Primary | ICD-10-CM

## 2021-04-07 PROCEDURE — G8427 DOCREV CUR MEDS BY ELIG CLIN: HCPCS | Performed by: PODIATRIST

## 2021-04-07 PROCEDURE — 3046F HEMOGLOBIN A1C LEVEL >9.0%: CPT | Performed by: PODIATRIST

## 2021-04-07 PROCEDURE — 99213 OFFICE O/P EST LOW 20 MIN: CPT | Performed by: PODIATRIST

## 2021-04-07 PROCEDURE — 1036F TOBACCO NON-USER: CPT | Performed by: PODIATRIST

## 2021-04-07 PROCEDURE — 2022F DILAT RTA XM EVC RTNOPTHY: CPT | Performed by: PODIATRIST

## 2021-04-07 PROCEDURE — G8417 CALC BMI ABV UP PARAM F/U: HCPCS | Performed by: PODIATRIST

## 2021-04-07 NOTE — PROGRESS NOTES
21     [facility]    : 1985   Sex: female    Age: 38 y.o. Patient's PCP/Provider is:  Bronwyn Birmingham MD    Subjective:  Patient is seen today for evaluation regarding burning and paresthesias into the medial left midfoot region. Patient stated the issues been going on over the last several weeks, and she has been unable to wear her closed in shoes due to the discomfort. Patient states most of the symptoms occur at nighttime, when she is off of her feet. She cannot recall any recent injuries or changes in activities. She presented today to discuss further treatment options available. Chief Complaint   Patient presents with    Foot Pain     left        ROS:  Const: Positives and pertinent negatives as per HPI. Musculo: Denies symptoms other than stated above. Neuro: Denies symptoms other than stated above. Skin: Denies symptoms other than stated above. Current Medications:    Current Outpatient Medications:     Semaglutide (OZEMPIC, 0.25 OR 0.5 MG/DOSE, SC), Inject into the skin, Disp: , Rfl:     estradiol (VIVELLE) 0.1 MG/24HR, , Disp: , Rfl:     Phentermine-Topiramate (QSYMIA) 3.75-23 MG CP24, Take 1 capsule by mouth., Disp: , Rfl:     metFORMIN (GLUCOPHAGE) 1000 MG tablet, Take 1,000 mg by mouth 2 times daily (with meals), Disp: , Rfl:     LEVOTHYROXINE SODIUM PO, Take by mouth, Disp: , Rfl:     OXcarbazepine (TRILEPTAL PO), Take by mouth, Disp: , Rfl:     naproxen (NAPROSYN) 500 MG tablet, Take 1 tablet by mouth 2 times daily, Disp: 60 tablet, Rfl: 0    Allergies: Allergies   Allergen Reactions    Zonisamide Itching and Rash    Lamictal [Lamotrigine]        Vitals:    21 0730   Weight: 185 lb (83.9 kg)   Height: 5' 8\" (1.727 m)       Exam:  VASCULAR: Pedal pulses palpable left foot. NEUROLOGICAL: Epicritic sensations intact left foot.   Moderate paresthesias noted along the first proper digital nerve left medial midfoot region to note was reviewed however may contain grammatical errors.

## 2021-04-07 NOTE — PROGRESS NOTES
Patient is here for left foot pain. Patient states it is when she is resting. Pain starts at the great to and wraps around the inside to the ankle. The pain keeps her up at night and has a hard time wearing closed toes shoes. The pain feels like pins and needles, burning pain and throbbing.  Meg Hoyos MD 3/31/21  Electronically signed by Roman Dior LPN on 2/6/1805 at 8:51 AM

## 2021-05-12 ENCOUNTER — OFFICE VISIT (OUTPATIENT)
Dept: PODIATRY | Age: 36
End: 2021-05-12
Payer: COMMERCIAL

## 2021-05-12 VITALS — BODY MASS INDEX: 28.04 KG/M2 | WEIGHT: 185 LBS | HEIGHT: 68 IN

## 2021-05-12 DIAGNOSIS — E11.41 DIABETIC MONONEUROPATHY ASSOCIATED WITH TYPE 2 DIABETES MELLITUS (HCC): ICD-10-CM

## 2021-05-12 DIAGNOSIS — L60.0 OC (ONYCHOCRYPTOSIS): ICD-10-CM

## 2021-05-12 DIAGNOSIS — R20.2 PARESTHESIA OF LEFT FOOT: Primary | ICD-10-CM

## 2021-05-12 PROCEDURE — 99213 OFFICE O/P EST LOW 20 MIN: CPT | Performed by: PODIATRIST

## 2021-05-12 RX ORDER — LEUCOVORIN/PYRIDOX/MECOBALAMIN 4-50-2 MG
4-50 TABLET ORAL 2 TIMES DAILY
Qty: 90 TABLET | Refills: 2 | Status: SHIPPED | OUTPATIENT
Start: 2021-05-12

## 2021-05-12 NOTE — PROGRESS NOTES
Patient is here 1 month follow up left foot. Patient states not as bad as before. Patient has been using [facility] cream. Patient states she still has pain in the left great to. Patient has trouble with the pain at night. Patient does not know the cause.   Virgie Torrez MD 5/4/2021  Electronically signed by Pravin Quiroga LPN on 7/83/1667 at 0:94 AM
both great toes. No signs of infection noted to both great toes. No maceration webspaces noted bilateral foot. Mild paresthesias noted into the digital regions left foot. No increased swelling or ecchymosis noted to both lower extremities. No plantar calluses, ulcerations, heel fissuring noted bilateral foot. Diagnostic Studies:     No results found. Procedures:    None    Plan Per Assessment  Dany Vogt was seen today for follow-up. Diagnoses and all orders for this visit:    Paresthesia of left foot  -     Folinic Acid-Vit B6-Vit B12 (FOLINIC-PLUS) 4-50-2 MG TABS; Take 4-50 mg by mouth 2 times daily    Diabetic mononeuropathy associated with type 2 diabetes mellitus (HCC)  -     Folinic Acid-Vit B6-Vit B12 (FOLINIC-PLUS) 4-50-2 MG TABS; Take 4-50 mg by mouth 2 times daily    OC (onychocryptosis)      1. Evaluation and management  2. We did discuss continued treatment options with patient in detail today. Prescription medication Folinic plus given with exact instructions on usage. I discussed the potential side effects that the patient may experience with the medication and the need to call if they experience any. Patient will be followed up in 2 months time or sooner if needed for continued diabetic foot evaluation and care. Seen By:    Soo Newell DPM    Electronically signed by Soo Newell DPM on 5/12/2021 at 7:34 AM    This note was created using voice recognition software. The note was reviewed however may contain grammatical errors.

## 2021-07-14 ENCOUNTER — OFFICE VISIT (OUTPATIENT)
Dept: PODIATRY | Age: 36
End: 2021-07-14
Payer: COMMERCIAL

## 2021-07-14 VITALS — BODY MASS INDEX: 28.04 KG/M2 | WEIGHT: 185 LBS | HEIGHT: 68 IN

## 2021-07-14 DIAGNOSIS — R20.2 PARESTHESIA OF LEFT FOOT: ICD-10-CM

## 2021-07-14 DIAGNOSIS — E11.41 DIABETIC MONONEUROPATHY ASSOCIATED WITH TYPE 2 DIABETES MELLITUS (HCC): Primary | ICD-10-CM

## 2021-07-14 PROCEDURE — 99213 OFFICE O/P EST LOW 20 MIN: CPT | Performed by: PODIATRIST

## 2021-07-14 NOTE — PROGRESS NOTES
Patient is here for follow up left foot pain. Patient states it is doing better, but she has noticed pain on the top of the foot.  Jassi Glasgow MD 6/29/2021  Electronically signed by Chelsie Thomas LPN on 2/42/3326 at 8:09 AM

## 2021-07-14 NOTE — PROGRESS NOTES
21     Nataliya Quintero    : 1985   Sex: female    Age: 43 y.o. Patient's PCP/Provider is:  Marilee Guzman MD    Subjective:  Patient is seen today for follow-up regarding diabetic neuropathy issues to both lower extremities. Patient has noticed improvement in the burning sensation she was experiencing into both feet. She has been taking the oral multivitamin as instructed. She has been trying to wear good supportive shoe gear with all ambulatory activities. No other additional abnormalities noted at this time. Chief Complaint   Patient presents with    Follow-up     left foot        ROS:  Const: Positives and pertinent negatives as per HPI. Musculo: Denies symptoms other than stated above. Neuro: Denies symptoms other than stated above. Skin: Denies symptoms other than stated above. Current Medications:    Current Outpatient Medications:     Folinic Acid-Vit B6-Vit B12 (FOLINIC-PLUS) 4-50-2 MG TABS, Take 4-50 mg by mouth 2 times daily, Disp: 90 tablet, Rfl: 2    Semaglutide (OZEMPIC, 0.25 OR 0.5 MG/DOSE, SC), Inject into the skin, Disp: , Rfl:     estradiol (VIVELLE) 0.1 MG/24HR, , Disp: , Rfl:     Phentermine-Topiramate (QSYMIA) 3.75-23 MG CP24, Take 1 capsule by mouth., Disp: , Rfl:     metFORMIN (GLUCOPHAGE) 1000 MG tablet, Take 1,000 mg by mouth 2 times daily (with meals), Disp: , Rfl:     LEVOTHYROXINE SODIUM PO, Take by mouth, Disp: , Rfl:     OXcarbazepine (TRILEPTAL PO), Take by mouth, Disp: , Rfl:     naproxen (NAPROSYN) 500 MG tablet, Take 1 tablet by mouth 2 times daily, Disp: 60 tablet, Rfl: 0    Allergies: Allergies   Allergen Reactions    Zonisamide Itching and Rash    Lamictal [Lamotrigine]        Vitals:    21 0703   Weight: 185 lb (83.9 kg)   Height: 5' 8\" (1.727 m)       Exam:  Neurovascular status unchanged. Paresthesias noted to both lower extremities. No edema or ecchymotic skin changes present into both lower extremities.   Digital nails remained stable at this time bilateral foot. No maceration of webspaces noted bilateral foot. Diagnostic Studies:     No results found. Procedures:    None    Plan Per Assessment  Carlos Wilson was seen today for follow-up. Diagnoses and all orders for this visit:    Diabetic mononeuropathy associated with type 2 diabetes mellitus ([facility] [address]    Paresthesia of left foot      1. Evaluation and management  2. We will continue with current care and treatment. Patient will continue with her oral alto vitamin as instructed. 3. We did discuss continued treatment options with patient in detail today. 4. Patient will be followed up at a later date for continued evaluation and care. Seen By:    Radha Watkins DPM    Electronically signed by Radha Watkins DPM on 7/14/2021 at 7:14 AM    This note was created using voice recognition software. The note was reviewed however may contain grammatical errors.

## 2021-09-15 ENCOUNTER — OFFICE VISIT (OUTPATIENT)
Dept: PODIATRY | Age: 36
End: 2021-09-15
Payer: COMMERCIAL

## 2021-09-15 VITALS — WEIGHT: 185 LBS | HEIGHT: 68 IN | BODY MASS INDEX: 28.04 KG/M2

## 2021-09-15 DIAGNOSIS — L60.0 OC (ONYCHOCRYPTOSIS): ICD-10-CM

## 2021-09-15 DIAGNOSIS — E11.41 DIABETIC MONONEUROPATHY ASSOCIATED WITH TYPE 2 DIABETES MELLITUS (HCC): Primary | ICD-10-CM

## 2021-09-15 PROCEDURE — 99213 OFFICE O/P EST LOW 20 MIN: CPT | Performed by: PODIATRIST

## 2021-09-15 NOTE — PROGRESS NOTES
Patient in today for nail care. Patient does not have any complaints of pain at this time.  Patient's PCP is Delroy Butterfield MD date of last ov 9/7/2021        Pavel Brewer LPN
associated with type 2 diabetes mellitus ([facility]    OC (onychocryptosis)      1. Evaluation and management  2. Debridement ingrown nails was performed to patient tolerance. No nail matrix procedure performed on today's visit. 3. We did discuss additional diabetic foot care techniques with patient today to prevent potential lower extremity issues from occurring. 4. Patient will be followed up at a later date for continued diabetic foot evaluation and management. Seen By:    Kay Serrano DPM    Electronically signed by Kay Serrano DPM on 9/15/2021 at 12:00 PM    This note was created using voice recognition software. The note was reviewed however may contain grammatical errors.

## 2021-11-17 ENCOUNTER — OFFICE VISIT (OUTPATIENT)
Dept: PODIATRY | Age: 36
End: 2021-11-17
Payer: COMMERCIAL

## 2021-11-17 VITALS — HEIGHT: 68 IN | WEIGHT: 185 LBS | BODY MASS INDEX: 28.04 KG/M2

## 2021-11-17 DIAGNOSIS — L60.0 OC (ONYCHOCRYPTOSIS): Primary | ICD-10-CM

## 2021-11-17 DIAGNOSIS — E11.9 TYPE 2 DIABETES MELLITUS WITHOUT COMPLICATION, WITHOUT LONG-TERM CURRENT USE OF INSULIN (HCC): ICD-10-CM

## 2021-11-17 PROCEDURE — 99213 OFFICE O/P EST LOW 20 MIN: CPT | Performed by: PODIATRIST

## 2021-11-17 NOTE — PROGRESS NOTES
21     Vincent Alvarez    : 1985   Sex: female    Age: 43 y.o. Patient's PCP/Provider is:  Humphrey Grace MD    Subjective:  Patient is seen today for follow-up regarding continued evaluation regarding ingrown nail issues to both great toe regions. Patient denies any new issues at this time. Patient is moving [place] to the [place] area within the next month and a half. No other additional abnormalities noted. Chief Complaint   Patient presents with    Nail Problem       ROS:  Const: Positives and pertinent negatives as per HPI. Musculo: Denies symptoms other than stated above. Neuro: Denies symptoms other than stated above. Skin: Denies symptoms other than stated above. Current Medications:    Current Outpatient Medications:     Folinic Acid-Vit B6-Vit B12 (FOLINIC-PLUS) 4-50-2 MG TABS, Take 4-50 mg by mouth 2 times daily, Disp: 90 tablet, Rfl: 2    Semaglutide (OZEMPIC, 0.25 OR 0.5 MG/DOSE, SC), Inject into the skin, Disp: , Rfl:     estradiol (VIVELLE) 0.1 MG/24HR, , Disp: , Rfl:     Phentermine-Topiramate (QSYMIA) 3.75-23 MG CP24, Take 1 capsule by mouth., Disp: , Rfl:     metFORMIN (GLUCOPHAGE) 1000 MG tablet, Take 1,000 mg by mouth 2 times daily (with meals), Disp: , Rfl:     LEVOTHYROXINE SODIUM PO, Take by mouth, Disp: , Rfl:     OXcarbazepine (TRILEPTAL PO), Take by mouth, Disp: , Rfl:     naproxen (NAPROSYN) 500 MG tablet, Take 1 tablet by mouth 2 times daily, Disp: 60 tablet, Rfl: 0    Allergies: Allergies   Allergen Reactions    Zonisamide Itching and Rash    Lamictal [Lamotrigine]     Ciprofloxacin Rash       Vitals:    21 0653   Weight: 185 lb (83.9 kg)   Height: 5' 8\" (1.727 m)       Exam:  Neurovascular status unchanged. Ingrown nail issues noted to both great toes. No signs of infection noted to both great toe regions. No maceration webspaces noted bilateral foot.   No plantar calluses or ulcerations noted to both lower extremities. Diagnostic Studies:     No results found. Procedures:    None    Plan Per Assessment  Sparkle Moctezuma was seen today for nail problem. Diagnoses and all orders for this visit:    OC (onychocryptosis)    Type 2 diabetes mellitus without complication, without long-term current use of insulin ([facility] [address]      1. Evaluation and management  2. Debridement ingrown nails was performed to patient tolerance. No nail matrix procedures performed on today's visit. 3. We did discuss additional diabetic foot care techniques with patient in detail today. 4. Patient was advised to get in contact with a podiatrist in the [facility] for continued diabetic foot care in the future. She was advised to call the office if she needed assistance in finding an appropriate physician. Seen By:    Phil Bruno DPM    Electronically signed by Phil Bruno DPM on 11/17/2021 at 7:56 AM    This note was created using voice recognition software. The note was reviewed however may contain grammatical errors.

## 2021-11-17 NOTE — PROGRESS NOTES
Patient in today for nail care. Patient does not have any complaints of pain at this time.  Patient's PCP is Dawit Lyman MD date of last ov 11/16/2021        Vera Moore LPN

## 2022-02-10 ENCOUNTER — HOSPITAL ENCOUNTER (OUTPATIENT)
Age: 37
Discharge: HOME | End: 2022-02-12

## 2022-02-10 PROCEDURE — 88305 TISSUE EXAM BY PATHOLOGIST: CPT

## 2022-02-10 PROCEDURE — 88342 IMHCHEM/IMCYTCHM 1ST ANTB: CPT

## 2022-05-12 ENCOUNTER — TELEPHONE (OUTPATIENT)
Dept: PODIATRY | Age: 37
End: 2022-05-12